# Patient Record
Sex: FEMALE | Race: WHITE | Employment: PART TIME | URBAN - METROPOLITAN AREA
[De-identification: names, ages, dates, MRNs, and addresses within clinical notes are randomized per-mention and may not be internally consistent; named-entity substitution may affect disease eponyms.]

---

## 2017-03-16 ENCOUNTER — HOSPITAL ENCOUNTER (OUTPATIENT)
Dept: CV DIAGNOSTICS | Facility: HOSPITAL | Age: 37
Discharge: HOME | End: 2017-03-16
Attending: FAMILY MEDICINE
Payer: MEDICAID

## 2017-03-16 DIAGNOSIS — R00.2 PALPITATIONS: ICD-10-CM

## 2017-03-16 PROCEDURE — 93225 XTRNL ECG REC<48 HRS REC: CPT

## 2017-03-16 PROCEDURE — 93227 XTRNL ECG REC<48 HR R&I: CPT | Performed by: INTERNAL MEDICINE

## 2017-03-16 PROCEDURE — 93226 XTRNL ECG REC<48 HR SCAN A/R: CPT

## 2017-04-04 ENCOUNTER — HOSPITAL ENCOUNTER (OUTPATIENT)
Age: 37
Discharge: HOME | End: 2017-04-04
Attending: FAMILY MEDICINE
Payer: MEDICAID

## 2017-04-04 VITALS
BODY MASS INDEX: 29.82 KG/M2 | SYSTOLIC BLOOD PRESSURE: 127 MMHG | HEART RATE: 84 BPM | TEMPERATURE: 99 F | OXYGEN SATURATION: 100 % | RESPIRATION RATE: 18 BRPM | HEIGHT: 67 IN | WEIGHT: 190 LBS | DIASTOLIC BLOOD PRESSURE: 81 MMHG

## 2017-04-04 DIAGNOSIS — J01.00 ACUTE NON-RECURRENT MAXILLARY SINUSITIS: Primary | ICD-10-CM

## 2017-04-04 PROCEDURE — 99214 OFFICE O/P EST MOD 30 MIN: CPT

## 2017-04-04 PROCEDURE — 87081 CULTURE SCREEN ONLY: CPT | Performed by: FAMILY MEDICINE

## 2017-04-04 PROCEDURE — 87430 STREP A AG IA: CPT | Performed by: FAMILY MEDICINE

## 2017-04-04 RX ORDER — PREDNISONE 20 MG/1
TABLET ORAL
Qty: 10 TABLET | Refills: 0 | Status: SHIPPED | OUTPATIENT
Start: 2017-04-04 | End: 2018-04-10 | Stop reason: ALTCHOICE

## 2017-04-04 RX ORDER — AMOXICILLIN AND CLAVULANATE POTASSIUM 875; 125 MG/1; MG/1
875 TABLET, FILM COATED ORAL 2 TIMES DAILY
Qty: 20 TABLET | Refills: 0 | Status: SHIPPED | OUTPATIENT
Start: 2017-04-04 | End: 2018-04-10 | Stop reason: ALTCHOICE

## 2017-04-04 RX ORDER — FLUTICASONE PROPIONATE 50 MCG
SPRAY, SUSPENSION (ML) NASAL
Qty: 1 INHALER | Refills: 0 | Status: SHIPPED | OUTPATIENT
Start: 2017-04-04

## 2017-04-04 NOTE — ED PROVIDER NOTES
Patient Seen in: THE [facility]mediate Care In [facility]    History   Patient presents with:  Cough  Ear Pain  Sore Throat    Stated Complaint: Malcolm Mariscal / Bryant Soulier / Sheila Corral / Sapna Faust / NASAL PAIN / DENTAL PAIN    HPI    This 43-year-old female presents (VITAMIN A & D) [id number] UNITS Oral Cap,  Take  by mouth.        Family History   Problem Relation Age of Onset   • Stroke Maternal Grandmother    • Cancer Maternal Grandfather    • Heart Disease Maternal Grandfather          Smoking Status: Never Smoker has acute sinusitis. She is reassured her lungs are clear. I will treat with Augmentin 875 mg twice daily, prednisone 40 mg once daily for 5 days and Flonase. Symptomatic treatment is discussed.   She is to follow-up with her primary doctor in 3-5 days i congested. Push fluids. Humidified air. Follow-up with your primary doctor in 3-5 days if you are not improving. Go to the emergency room if you have increased difficulty breathing.

## 2017-04-04 NOTE — ED INITIAL ASSESSMENT (HPI)
Sinus pressure- x 10 days  Cough- x 1month; chest congestion.  mucinex  bilateral ear- pain started today  Upper right gum - pain

## 2017-05-21 ENCOUNTER — HOSPITAL ENCOUNTER (OUTPATIENT)
Age: 37
Discharge: HOME | End: 2017-05-21
Payer: MEDICAID

## 2017-05-21 ENCOUNTER — APPOINTMENT (OUTPATIENT)
Dept: GENERAL RADIOLOGY | Age: 37
End: 2017-05-21
Attending: PHYSICIAN ASSISTANT
Payer: MEDICAID

## 2017-05-21 VITALS
HEART RATE: 77 BPM | TEMPERATURE: 98 F | DIASTOLIC BLOOD PRESSURE: 85 MMHG | OXYGEN SATURATION: 100 % | SYSTOLIC BLOOD PRESSURE: 121 MMHG | RESPIRATION RATE: 16 BRPM

## 2017-05-21 DIAGNOSIS — J40 BRONCHITIS: Primary | ICD-10-CM

## 2017-05-21 PROCEDURE — 99214 OFFICE O/P EST MOD 30 MIN: CPT

## 2017-05-21 PROCEDURE — 87430 STREP A AG IA: CPT | Performed by: PHYSICIAN ASSISTANT

## 2017-05-21 PROCEDURE — 87081 CULTURE SCREEN ONLY: CPT | Performed by: PHYSICIAN ASSISTANT

## 2017-05-21 PROCEDURE — 71020 XR CHEST PA + LAT CHEST (CPT=71020): CPT | Performed by: PHYSICIAN ASSISTANT

## 2017-05-21 PROCEDURE — 94640 AIRWAY INHALATION TREATMENT: CPT

## 2017-05-21 RX ORDER — ALBUTEROL SULFATE 2.5 MG/3ML
2.5 SOLUTION RESPIRATORY (INHALATION) EVERY 6 HOURS PRN
Qty: 75 ML | Refills: 0 | Status: SHIPPED | OUTPATIENT
Start: 2017-05-21 | End: 2017-10-03

## 2017-05-21 RX ORDER — ALBUTEROL SULFATE 90 UG/1
2 AEROSOL, METERED RESPIRATORY (INHALATION) EVERY 4 HOURS PRN
Qty: 1 INHALER | Refills: 0 | Status: SHIPPED | OUTPATIENT
Start: 2017-05-21 | End: 2017-06-20

## 2017-05-21 RX ORDER — ALBUTEROL SULFATE 2.5 MG/3ML
2.5 SOLUTION RESPIRATORY (INHALATION) ONCE
Status: COMPLETED | OUTPATIENT
Start: 2017-05-21 | End: 2017-05-21

## 2017-05-21 RX ORDER — GUAIFENESIN 200 MG/1
400 TABLET ORAL EVERY 6 HOURS PRN
Qty: 30 TABLET | Refills: 0 | Status: SHIPPED | OUTPATIENT
Start: 2017-05-21 | End: 2018-04-10 | Stop reason: ALTCHOICE

## 2017-05-21 RX ORDER — METHYLPREDNISOLONE 4 MG/1
TABLET ORAL
Qty: 1 PACKAGE | Refills: 0 | Status: SHIPPED | OUTPATIENT
Start: 2017-05-21 | End: 2018-04-10 | Stop reason: ALTCHOICE

## 2017-05-21 NOTE — ED PROVIDER NOTES
Patient Seen in: THE [facility] OF [facility] Immediate Care In [facility] [address]    History   Patient presents with:  Cough/URI    Stated Complaint: Sinus Issues    HPI    CHIEF COMPLAINT: Sore throat, cough and nasal congestion     HISTORY OF PRESENT ILLNESS: Patient is a 40-year sulfate (2.5 MG/3ML) 0.083% Inhalation Nebu Soln,  Take 3 mL (2.5 mg total) by nebulization every 6 (six) hours as needed for Wheezing. Multiple Vitamins-Minerals (MULTI-VITAMIN/MINERALS) Oral Tab,  Take 1 tablet by mouth daily.    B-12 TR 1000 MCG Oral T Current:/85 mmHg  Pulse 77  Temp(Src) 97.9 °F (36.6 °C) (Temporal)  Resp 16  SpO2 100%  LMP 05/14/2017        Physical Exam    Vital signs and nursing notes reviewed  General Appearance: Patient is alert and oriented x4 in no acute distress  Ey effusions. BONES:  Normal for age.      5/21/2017  CONCLUSION:  Unremarkable chest radiographs.      Dictated by: Cassy De Jesus MD on 5/21/2017 at 12:01     Approved by: Cassy De Jesus MD            [facility]     70-year-old female diagnosed with bronchi

## 2017-05-21 NOTE — ED INITIAL ASSESSMENT (HPI)
Pt began 3 days ago with sore throat nasal congestion and cough.   Hurts to take a deep breath, and has some facial pain

## 2017-05-31 ENCOUNTER — HOSPITAL ENCOUNTER (OUTPATIENT)
Age: 37
Discharge: HOME | End: 2017-05-31
Payer: MEDICAID

## 2017-05-31 VITALS
TEMPERATURE: 99 F | SYSTOLIC BLOOD PRESSURE: 130 MMHG | HEART RATE: 97 BPM | HEIGHT: 67 IN | RESPIRATION RATE: 16 BRPM | DIASTOLIC BLOOD PRESSURE: 88 MMHG | WEIGHT: 195 LBS | OXYGEN SATURATION: 99 % | BODY MASS INDEX: 30.61 KG/M2

## 2017-05-31 DIAGNOSIS — J01.00 ACUTE NON-RECURRENT MAXILLARY SINUSITIS: Primary | ICD-10-CM

## 2017-05-31 PROCEDURE — 99214 OFFICE O/P EST MOD 30 MIN: CPT

## 2017-05-31 PROCEDURE — 99213 OFFICE O/P EST LOW 20 MIN: CPT

## 2017-05-31 RX ORDER — AMOXICILLIN AND CLAVULANATE POTASSIUM 875; 125 MG/1; MG/1
1 TABLET, FILM COATED ORAL 2 TIMES DAILY
Qty: 14 TABLET | Refills: 0 | Status: SHIPPED | OUTPATIENT
Start: 2017-05-31 | End: 2017-06-07

## 2017-06-01 NOTE — ED PROVIDER NOTES
Patient Seen in: THE MEDICAL [facility] Immediate Care In [facility]    History   Patient presents with:  Fever    Stated Complaint: fever,ha-poss reaction to meds    HPI    CHIEF COMPLAINT: Sinus pressure and pain, fever     HISTORY OF PRESENT ILLNESS: Patient is a 39 OTHER SURGICAL HISTORY      Comment Cyst removed from throat    OTHER      Comment syst removed from neck    THROAT SURGERY PROCEDURE UNLISTED  Dec 2014    Comment cyst removed from throat,        Medications :   Amoxicillin-Pot Clavulanate 875-125 MG O Smoking Status: Never Smoker                      Alcohol Use: Yes                Comment: occasional      Review of Systems    Positive for stated complaint: fever,ha-poss reaction to meds  Other systems are as noted in HPI.   Constitutional and vital sig are symmetrical, full range of motion  Psychiatric: calm and cooperative    ED Course   Labs Reviewed - No data to display    MDM     30-year-old female with acute sinusitis. DC Bactrim. Will put her on Augmentin.   She should follow-up with her primary c

## 2017-06-01 NOTE — ED NOTES
Pt called due to bodyaches,headache,fever of 100.4 today, no fever reducer taken ,she took benadryl because she thought she was having an allergic reaction. Pt advised to go to the ER as instructed in AVS for any new or  worsening symptoms,fever.  Pt verbal

## 2017-06-01 NOTE — ED INITIAL ASSESSMENT (HPI)
Headache- with bodyaches,chills, fever 100.4  after taking the bactrim  For UTI at 1245 pm today. ;pt also took medrol dose pack  and omeprazole 12:45 pm. . Pt concerned with allergic reaction.   Pt was seen here on the may 21 dx with bronchitis,  And today

## 2017-06-02 ENCOUNTER — APPOINTMENT (OUTPATIENT)
Dept: CT IMAGING | Facility: HOSPITAL | Age: 37
End: 2017-06-02
Attending: EMERGENCY MEDICINE
Payer: MEDICAID

## 2017-06-02 ENCOUNTER — HOSPITAL ENCOUNTER (EMERGENCY)
Facility: HOSPITAL | Age: 37
Discharge: HOME | End: 2017-06-02
Attending: EMERGENCY MEDICINE
Payer: MEDICAID

## 2017-06-02 VITALS
WEIGHT: 195 LBS | TEMPERATURE: 99 F | SYSTOLIC BLOOD PRESSURE: 128 MMHG | HEIGHT: 67 IN | DIASTOLIC BLOOD PRESSURE: 75 MMHG | HEART RATE: 90 BPM | BODY MASS INDEX: 30.61 KG/M2 | OXYGEN SATURATION: 98 % | RESPIRATION RATE: 16 BRPM

## 2017-06-02 DIAGNOSIS — J32.2 CHRONIC ETHMOIDAL SINUSITIS: Primary | ICD-10-CM

## 2017-06-02 PROCEDURE — 99285 EMERGENCY DEPT VISIT HI MDM: CPT

## 2017-06-02 PROCEDURE — 85025 COMPLETE CBC W/AUTO DIFF WBC: CPT | Performed by: EMERGENCY MEDICINE

## 2017-06-02 PROCEDURE — 81001 URINALYSIS AUTO W/SCOPE: CPT | Performed by: EMERGENCY MEDICINE

## 2017-06-02 PROCEDURE — 81025 URINE PREGNANCY TEST: CPT

## 2017-06-02 PROCEDURE — 87086 URINE CULTURE/COLONY COUNT: CPT | Performed by: EMERGENCY MEDICINE

## 2017-06-02 PROCEDURE — 80053 COMPREHEN METABOLIC PANEL: CPT | Performed by: EMERGENCY MEDICINE

## 2017-06-02 PROCEDURE — 70486 CT MAXILLOFACIAL W/O DYE: CPT | Performed by: EMERGENCY MEDICINE

## 2017-06-02 PROCEDURE — 99284 EMERGENCY DEPT VISIT MOD MDM: CPT

## 2017-06-02 PROCEDURE — 96360 HYDRATION IV INFUSION INIT: CPT

## 2017-06-02 RX ORDER — ACETAMINOPHEN 500 MG
1000 TABLET ORAL ONCE
Status: COMPLETED | OUTPATIENT
Start: 2017-06-02 | End: 2017-06-02

## 2017-06-02 RX ORDER — SODIUM CHLORIDE 9 MG/ML
1000 INJECTION, SOLUTION INTRAVENOUS ONCE
Status: COMPLETED | OUTPATIENT
Start: 2017-06-02 | End: 2017-06-02

## 2017-06-02 NOTE — ED NOTES
Pt states she is concerned about multiple symptoms that are not severe, but present such as stiff joints, numbness on/off R hand; joint pain in feet, spotty rash to lower extremities.   She does not know if this is from antibiotics she is taking

## 2017-06-02 NOTE — ED PROVIDER NOTES
Patient Seen in: [facility] Emergency Department    History   Patient presents with:  Headache (neurologic)    Stated Complaint: headache    HPI    27-year-old female presents emergency department who apparently was started on some Bactrim Wednesday 50 MCG/ACT Nasal Suspension,  Use 2 sprays each nostril once daily after shower. Stop if you develop nose bleeds. predniSONE 20 MG Oral Tab,  Take 2 tablets once daily with lunch for 5 days.    Multiple Vitamins-Minerals (MULTI-VITAMIN/MINERALS) Oral Tab, light extraocular muscles intact no scleral icterus, mucous membranes are moist, there is no erythema or exudate in the posterior pharynx, patient has sinus tenderness to percussion  Neck: Supple no JVD no lymphadenopathy no meningismus no carotid bruit  C her a lot of these complaints may not be will be fixed today but we will make sure she does not have a bladder infection. Will check her sinuses. And decide what antibiotic may be best for her.     Xr Chest Pa + Lat Chest (cpt=71020)    5/21/2017  PROCEDU large right irina bullosa within the middle turbinate. The left osteomeatal unit appears patent. SINUSES:  There is mild partial opacification within the posterior right-sided ethmoid air cells. No air-fluid levels are identified.  The bilateral maxillary,

## 2017-06-02 NOTE — ED INITIAL ASSESSMENT (HPI)
Pt started Bactrim on Wednesday for UTI. She began Augmentin for a sinus infection on Thursday. Pt is complaining of frontal headache and pain around her eyes. Pt complains that her eyes are bloodshot and complaining of stiffness of her hands.  Patient has

## 2017-08-16 ENCOUNTER — HOSPITAL ENCOUNTER (OUTPATIENT)
Age: 37
Discharge: HOME | End: 2017-08-16
Attending: FAMILY MEDICINE
Payer: MEDICAID

## 2017-08-16 VITALS
OXYGEN SATURATION: 98 % | RESPIRATION RATE: 16 BRPM | HEART RATE: 83 BPM | DIASTOLIC BLOOD PRESSURE: 87 MMHG | TEMPERATURE: 98 F | SYSTOLIC BLOOD PRESSURE: 121 MMHG

## 2017-08-16 DIAGNOSIS — N39.0 UTI (URINARY TRACT INFECTION), UNCOMPLICATED: Primary | ICD-10-CM

## 2017-08-16 DIAGNOSIS — J32.0 CHRONIC MAXILLARY SINUSITIS: ICD-10-CM

## 2017-08-16 DIAGNOSIS — J30.2 ACUTE SEASONAL ALLERGIC RHINITIS, UNSPECIFIED TRIGGER: ICD-10-CM

## 2017-08-16 LAB
POCT BILIRUBIN URINE: NEGATIVE
POCT BLOOD URINE: NEGATIVE
POCT GLUCOSE URINE: NEGATIVE MG/DL
POCT KETONE URINE: NEGATIVE MG/DL
POCT NITRITE URINE: NEGATIVE
POCT PH URINE: 5.5 (ref 5–8)
POCT PROTEIN URINE: NEGATIVE MG/DL
POCT SPECIFIC GRAVITY URINE: 1
POCT URINE PREGNANCY: NEGATIVE
POCT UROBILINOGEN URINE: 0.2 MG/DL

## 2017-08-16 PROCEDURE — 81002 URINALYSIS NONAUTO W/O SCOPE: CPT | Performed by: FAMILY MEDICINE

## 2017-08-16 PROCEDURE — 99214 OFFICE O/P EST MOD 30 MIN: CPT

## 2017-08-16 PROCEDURE — 81025 URINE PREGNANCY TEST: CPT | Performed by: FAMILY MEDICINE

## 2017-08-16 PROCEDURE — 87086 URINE CULTURE/COLONY COUNT: CPT | Performed by: FAMILY MEDICINE

## 2017-08-16 RX ORDER — CEFUROXIME AXETIL 250 MG/1
250 TABLET ORAL 2 TIMES DAILY
Qty: 14 TABLET | Refills: 0 | Status: SHIPPED | OUTPATIENT
Start: 2017-08-16 | End: 2018-04-10 | Stop reason: ALTCHOICE

## 2017-08-16 NOTE — ED PROVIDER NOTES
Patient Seen in: THE MEDICAL [facility] Immediate Care In [facility]    History   Patient presents with:  Cough/URI  Urinary Symptoms (urologic)    Stated Complaint: 2-3 DAYS SINUS ISSUES    HPI    This 40-year-old female presents to the office with complaint of sinus needed for Wheezing. Amoxicillin-Pot Clavulanate (AUGMENTIN) 875-125 MG Oral Tab,  Take 1 tablet by mouth 2 (two) times daily. TAKE WITH FOOD. STOP FOR GI UPSET. predniSONE 20 MG Oral Tab,  Take 2 tablets once daily with lunch for 5 days.    Multiple Vi conjunctiva normal.  EARS: Tympanic membranes normal, EAC's normal.  NOSE: Turbinates congested, no bleeding noted. PHARYNX:  No eythema or exudates, tonsils normal size, airway patent, uvula midline  NECK:  No cervical lymphadenopathy.  No thyromegaly,  H symptoms worsen    Serg Lainez MD  [address] [facility],Fourth [address] [facility] [address]  [phone]    Schedule an appointment as soon as possible for a visit in 1 week  regarding your sinuses      Medications Prescribed:  Current Discharge Medication Alray Stable

## 2017-08-16 NOTE — ED INITIAL ASSESSMENT (HPI)
Patient presents with complaints of sinus congestion with facial pian under the right eye orbit and nasal congestion with episodes  of feeling dizzy x 4 days. Patient also complains of suprapubic pressure, urinary urgency x 2 days.  Patient does and=ben to

## 2017-10-03 ENCOUNTER — HOSPITAL ENCOUNTER (OUTPATIENT)
Age: 37
Discharge: HOME | End: 2017-10-03
Attending: FAMILY MEDICINE
Payer: MEDICAID

## 2017-10-03 VITALS
TEMPERATURE: 99 F | DIASTOLIC BLOOD PRESSURE: 89 MMHG | HEART RATE: 85 BPM | OXYGEN SATURATION: 98 % | SYSTOLIC BLOOD PRESSURE: 134 MMHG | RESPIRATION RATE: 20 BRPM

## 2017-10-03 DIAGNOSIS — J45.21 MILD INTERMITTENT ASTHMA WITH EXACERBATION: Primary | ICD-10-CM

## 2017-10-03 PROCEDURE — 99214 OFFICE O/P EST MOD 30 MIN: CPT

## 2017-10-03 PROCEDURE — 94640 AIRWAY INHALATION TREATMENT: CPT

## 2017-10-03 RX ORDER — PREDNISONE 20 MG/1
TABLET ORAL
Qty: 6 TABLET | Refills: 0 | Status: SHIPPED | OUTPATIENT
Start: 2017-10-03 | End: 2018-04-10 | Stop reason: ALTCHOICE

## 2017-10-03 RX ORDER — ALBUTEROL SULFATE 2.5 MG/3ML
2.5 SOLUTION RESPIRATORY (INHALATION) EVERY 4 HOURS PRN
Qty: 1 BOX | Refills: 0 | Status: SHIPPED | OUTPATIENT
Start: 2017-10-03

## 2017-10-03 RX ORDER — ALBUTEROL SULFATE 90 UG/1
2 AEROSOL, METERED RESPIRATORY (INHALATION) EVERY 4 HOURS PRN
Qty: 1 INHALER | Refills: 0 | Status: SHIPPED | OUTPATIENT
Start: 2017-10-03

## 2017-10-03 RX ORDER — IPRATROPIUM BROMIDE AND ALBUTEROL SULFATE 2.5; .5 MG/3ML; MG/3ML
3 SOLUTION RESPIRATORY (INHALATION) ONCE
Status: COMPLETED | OUTPATIENT
Start: 2017-10-03 | End: 2017-10-03

## 2017-10-04 NOTE — ED PROVIDER NOTES
Patient Seen in: [facility] In [facility]    History   Patient presents with:  Cough/URI    Stated Complaint: congestion     HPI    This 20-year-old female presents to the office with a two-week history of nasal congestion, cough and wheezing Exam    General: WH/WN/WD, in no respiratory distress, A and O times 3  HEAD: Normocephalic, atraumatic  EYES: Sclera anicteric,  conjunctiva normal.  EARS: Tympanic membranes normal, EAC's normal.  NOSE: Turbinates mildly congested, no bleeding noted.   [address] [address]  [phone]    Schedule an appointment as soon as possible for a visit in 2 days  If symptoms worsen      Medications Prescribed:  Current Discharge Medication List    START taking these medications    ! ! predniSONE 20 MG Oral Tab  Take 2 tablets w

## 2018-04-10 ENCOUNTER — HOSPITAL ENCOUNTER (OUTPATIENT)
Age: 38
Discharge: HOME | End: 2018-04-10
Payer: MEDICAID

## 2018-04-10 ENCOUNTER — APPOINTMENT (OUTPATIENT)
Dept: CT IMAGING | Age: 38
End: 2018-04-10
Attending: PHYSICIAN ASSISTANT
Payer: MEDICAID

## 2018-04-10 VITALS
TEMPERATURE: 99 F | SYSTOLIC BLOOD PRESSURE: 122 MMHG | HEART RATE: 89 BPM | DIASTOLIC BLOOD PRESSURE: 92 MMHG | RESPIRATION RATE: 18 BRPM | OXYGEN SATURATION: 98 %

## 2018-04-10 DIAGNOSIS — Z87.440 HISTORY OF FREQUENT URINARY TRACT INFECTIONS: ICD-10-CM

## 2018-04-10 DIAGNOSIS — R10.9 FLANK PAIN: ICD-10-CM

## 2018-04-10 DIAGNOSIS — J01.41 ACUTE RECURRENT PANSINUSITIS: Primary | ICD-10-CM

## 2018-04-10 PROCEDURE — 36415 COLL VENOUS BLD VENIPUNCTURE: CPT

## 2018-04-10 PROCEDURE — 99214 OFFICE O/P EST MOD 30 MIN: CPT

## 2018-04-10 PROCEDURE — 74176 CT ABD & PELVIS W/O CONTRAST: CPT | Performed by: PHYSICIAN ASSISTANT

## 2018-04-10 PROCEDURE — 87086 URINE CULTURE/COLONY COUNT: CPT | Performed by: PHYSICIAN ASSISTANT

## 2018-04-10 PROCEDURE — 81025 URINE PREGNANCY TEST: CPT | Performed by: PHYSICIAN ASSISTANT

## 2018-04-10 PROCEDURE — 85025 COMPLETE CBC W/AUTO DIFF WBC: CPT | Performed by: PHYSICIAN ASSISTANT

## 2018-04-10 PROCEDURE — 80047 BASIC METABLC PNL IONIZED CA: CPT

## 2018-04-10 PROCEDURE — 81002 URINALYSIS NONAUTO W/O SCOPE: CPT | Performed by: PHYSICIAN ASSISTANT

## 2018-04-10 RX ORDER — PREDNISONE 20 MG/1
40 TABLET ORAL DAILY
Qty: 8 TABLET | Refills: 0 | Status: SHIPPED | OUTPATIENT
Start: 2018-04-10 | End: 2018-04-14

## 2018-04-10 RX ORDER — CHOLECALCIFEROL (VITAMIN D3) 1250 MCG
CAPSULE ORAL
COMMUNITY

## 2018-04-10 RX ORDER — AMOXICILLIN AND CLAVULANATE POTASSIUM 875; 125 MG/1; MG/1
1 TABLET, FILM COATED ORAL 2 TIMES DAILY
Qty: 14 TABLET | Refills: 0 | Status: SHIPPED | OUTPATIENT
Start: 2018-04-10 | End: 2018-04-17

## 2018-04-10 NOTE — ED PROVIDER NOTES
Patient Seen in: [address] [facility] In [facility]    History   Patient presents with:  Sinus Problem    Stated Complaint: 7-10 days uti & sinus issues    HPI    43-year-old female with multiple complaints:  PT reports that she suffers from frequent s [04/10/18 0922]  BP: 122/92  Pulse: 88  Resp: 20  Temp: 98.5 °F (36.9 °C)  Temp src: Temporal  SpO2: 99 %  O2 Device: None (Room air)    Current:/92   Pulse 89   Temp 98.5 °F (36.9 °C) (Temporal)   Resp 18   LMP 03/30/2018   SpO2 98%         Physical following:     ISTAT Glucose 120 (*)     All other components within normal limits   POCT PREGNANCY, URINE - Normal   POCT CBC   URINE CULTURE, ROUTINE   Ct Abdomen+pelvis Kidneystone 2d Rndr(no Iv,no Oral)(cpt=74176)    Result Date: 4/10/2018  PROCEDURE: ED Course as of Apr 10 1117  ------------------------------------------------------------       [facility]     Clinical Impression: Sinusitis/flank pain/frequent urinary tract infections.   Course of Treatment: Please take the Augmentin in tandem with the c

## 2018-04-10 NOTE — ED INITIAL ASSESSMENT (HPI)
Pt states she has had sinus trouble since 2014. States has had numerous sinus infection and UTIs  States always has urinary frequency. Describes pain below left breast and under her left arm and thoracic back. Lower back pain bilaterally on palpation.

## 2023-10-17 ENCOUNTER — APPOINTMENT (OUTPATIENT)
Dept: URBAN - METROPOLITAN AREA CLINIC 241 | Age: 43
Setting detail: DERMATOLOGY
End: 2023-10-17

## 2023-10-17 DIAGNOSIS — L82.1 OTHER SEBORRHEIC KERATOSIS: ICD-10-CM

## 2023-10-17 DIAGNOSIS — Z71.89 OTHER SPECIFIED COUNSELING: ICD-10-CM

## 2023-10-17 DIAGNOSIS — L81.4 OTHER MELANIN HYPERPIGMENTATION: ICD-10-CM

## 2023-10-17 DIAGNOSIS — D49.2 NEOPLASM OF UNSPECIFIED BEHAVIOR OF BONE, SOFT TISSUE, AND SKIN: ICD-10-CM

## 2023-10-17 DIAGNOSIS — L57.8 OTHER SKIN CHANGES DUE TO CHRONIC EXPOSURE TO NONIONIZING RADIATION: ICD-10-CM

## 2023-10-17 DIAGNOSIS — L91.8 OTHER HYPERTROPHIC DISORDERS OF THE SKIN: ICD-10-CM

## 2023-10-17 DIAGNOSIS — D22 MELANOCYTIC NEVI: ICD-10-CM

## 2023-10-17 DIAGNOSIS — D18.0 HEMANGIOMA: ICD-10-CM

## 2023-10-17 DIAGNOSIS — L20.89 OTHER ATOPIC DERMATITIS: ICD-10-CM

## 2023-10-17 PROBLEM — D22.61 MELANOCYTIC NEVI OF RIGHT UPPER LIMB, INCLUDING SHOULDER: Status: ACTIVE | Noted: 2023-10-17

## 2023-10-17 PROBLEM — D18.01 HEMANGIOMA OF SKIN AND SUBCUTANEOUS TISSUE: Status: ACTIVE | Noted: 2023-10-17

## 2023-10-17 PROCEDURE — OTHER PRESCRIPTION MEDICATION MANAGEMENT: OTHER

## 2023-10-17 PROCEDURE — OTHER COUNSELING: OTHER

## 2023-10-17 PROCEDURE — OTHER BENIGN DESTRUCTION COSMETIC: OTHER

## 2023-10-17 PROCEDURE — OTHER MIPS QUALITY: OTHER

## 2023-10-17 PROCEDURE — OTHER PRESCRIPTION: OTHER

## 2023-10-17 PROCEDURE — OTHER MEDICATION COUNSELING: OTHER

## 2023-10-17 PROCEDURE — 11306 SHAVE SKIN LESION 0.6-1.0 CM: CPT

## 2023-10-17 PROCEDURE — 99213 OFFICE O/P EST LOW 20 MIN: CPT | Mod: 25

## 2023-10-17 PROCEDURE — OTHER SHAVE REMOVAL: OTHER

## 2023-10-17 RX ORDER — TRIAMCINOLONE ACETONIDE 1 MG/G
CREAM TOPICAL
Qty: 80 | Refills: 3 | Status: ERX | COMMUNITY
Start: 2023-10-17

## 2023-10-17 ASSESSMENT — LOCATION ZONE DERM
LOCATION ZONE: NECK
LOCATION ZONE: ARM
LOCATION ZONE: AXILLAE
LOCATION ZONE: LEG
LOCATION ZONE: VULVA
LOCATION ZONE: AXILLAE
LOCATION ZONE: TRUNK

## 2023-10-17 ASSESSMENT — LOCATION DETAILED DESCRIPTION DERM
LOCATION DETAILED: LEFT PROXIMAL PRETIBIAL REGION
LOCATION DETAILED: RIGHT AXILLARY VAULT
LOCATION DETAILED: RIGHT AXILLARY VAULT
LOCATION DETAILED: LEFT INFERIOR ANTERIOR NECK
LOCATION DETAILED: LEFT MEDIAL SUPERIOR CHEST
LOCATION DETAILED: MONS PUBIS
LOCATION DETAILED: RIGHT ANTERIOR PROXIMAL UPPER ARM

## 2023-10-17 ASSESSMENT — LOCATION SIMPLE DESCRIPTION DERM
LOCATION SIMPLE: RIGHT AXILLARY VAULT
LOCATION SIMPLE: LEFT ANTERIOR NECK
LOCATION SIMPLE: LEFT PRETIBIAL REGION
LOCATION SIMPLE: RIGHT AXILLARY VAULT
LOCATION SIMPLE: GROIN
LOCATION SIMPLE: RIGHT UPPER ARM
LOCATION SIMPLE: CHEST

## 2023-10-17 NOTE — PROCEDURE: PRESCRIPTION MEDICATION MANAGEMENT
Continue Regimen: Triamcinalone 0.1% topical cream
Render In Strict Bullet Format?: No
Detail Level: Zone

## 2023-10-17 NOTE — PROCEDURE: BENIGN DESTRUCTION COSMETIC
Anesthesia Volume In Cc: 0.5
Post-Care Instructions: I reviewed with the patient in detail post-care instructions. Patient is to wear sunprotection, and avoid picking at any of the treated lesions. Pt may apply Vaseline to crusted or scabbing areas.
Price (Use Numbers Only, No Special Characters Or $): 15
Detail Level: Detailed
Consent: The patient's consent was obtained including but not limited to risks of crusting, scabbing, blistering, scarring, darker or lighter pigmentary change, recurrence, incomplete removal and infection.

## 2023-10-17 NOTE — PROCEDURE: MIPS QUALITY
Detail Level: Detailed
Quality 110: Preventive Care And Screening: Influenza Immunization: Influenza Immunization previously received during influenza season
Quality 431: Preventive Care And Screening: Unhealthy Alcohol Use - Screening: Patient not identified as an unhealthy alcohol user when screened for unhealthy alcohol use using a systematic screening method
Quality 130: Documentation Of Current Medications In The Medical Record: Current Medications Documented
Quality 226: Preventive Care And Screening: Tobacco Use: Screening And Cessation Intervention: Patient screened for tobacco use and is an ex/non-smoker
Quality 47: Advance Care Plan: Advance Care Planning discussed and documented in the medical record; patient did not wish or was not able to name a surrogate decision maker or provide an advance care plan.

## 2023-10-17 NOTE — PROCEDURE: SHAVE REMOVAL
Medical Necessity Information: It is in your best interest to select a reason for this procedure from the list below. All of these items fulfill various CMS LCD requirements except the new and changing color options.
Medical Necessity Clause: This procedure was medically necessary because the lesion that was treated was:
Lab: -[id number]
Body Location Override (Optional - Billing Will Still Be Based On Selected Body Map Location If Applicable): right groin
Detail Level: Detailed
Was A Bandage Applied: Yes
Size Of Lesion In Cm (Required): 0.8
X Size Of Lesion In Cm (Optional): 0
Depth Of Shave: dermis
Biopsy Method: Personna blade
Anesthesia Type: 1% lidocaine with epinephrine
Hemostasis: Drysol
Wound Care: Petrolatum
Render Path Notes In Note?: No
Consent was obtained from the patient. The risks and benefits to therapy were discussed in detail. Specifically, the risks of infection, scarring, bleeding, prolonged wound healing, incomplete removal, allergy to anesthesia, nerve injury and recurrence were addressed. Prior to the procedure, the treatment site was clearly identified and confirmed by the patient. All components of Universal Protocol/PAUSE Rule completed.
Post-Care Instructions: I reviewed with the patient in detail post-care instructions. Patient is to keep the biopsy site dry overnight, and then apply bacitracin twice daily until healed. Patient may apply hydrogen peroxide soaks to remove any crusting.
Notification Instructions: Patient will be notified of pathology results. However, patient instructed to call the office if not contacted within 2 weeks.
Billing Type: Third-Party Bill

## 2024-02-13 NOTE — PROCEDURE: MEDICATION COUNSELING
13-Feb-2024 04:05 Tremfya Counseling: I discussed with the patient the risks of guselkumab including but not limited to immunosuppression, serious infections, and drug reactions.  The patient understands that monitoring is required including a PPD at baseline and must alert us or the primary physician if symptoms of infection or other concerning signs are noted.

## (undated) NOTE — LETTER
Date & Time: 4/10/2018, 11:11 AM  Patient: Sana Kumar  Attending Provider:    Sincerely,    WANDA Damon         To Whom It May Concern:    Mishel Bender was seen and treated in our department on 4/10/2018.  She may return to school tomorrow if

## (undated) NOTE — ED AVS SNAPSHOT
[facility] in [address] [place] [address] Drive,4Th Floor    [address] [place] [address]    Phone:  [phone]    Fax:  [phone]           Danish Alberts   MRN: [id number]    Department:  [facility] in [facility] [place]   Date of Visit:  5/31/2017 [address]  [phone] [address] [place] [address], [facility]   [phone]       To Check ER Wait Times:  TEXT 'Tito Shahid' to 69[address]      Click [email or web address]      Or call [phone]    If you have phone number before you leave. After you leave, you should follow the attached instructions. I have read and understand the instructions given to me by my caregivers.         24-Hour Pharmacies        Pharmacy Address Phone Number   [phone] view more details from this visit by going to [email or web address]. If you've recently had a stay at the Hospital you can access your discharge instructions in [employer] by going to Visits < Admission Summaries.  If you've been to the Emergency Depar

## (undated) NOTE — ED AVS SNAPSHOT
[facility] in [address] [place] [address] Drive,4Th Floor    [address] [place] [address]    Phone:  [phone]    Fax:  [phone]           Young Morocho   MRN: [id number]    Department:  THE [facility] OF [facility] Immediate Care in [facility] END   Date of Visit:  4/4/2017 Where to Get Your Medications      These medications were sent to [address], [address], [phone], [address], [place] [facility] [address]    Hours:  24-hours Phone:  [phone] nuestro adminstrador de elvin al [phone]. Expect to receive an electronic request (by e-mail or text) to complete a self-assessment the day after your visit. You may also receive a call from our patient liason soon after your visit.  Also, some [place] [address] (Sherlyn Augustine) [address]  ([address]) [address] [facility]. ([address]) [address] [facility] [address] (8 [employer] questions? Call [phone] for help. [employer] is NOT to be used for urgent needs. For medical emergencies, dial 911.

## (undated) NOTE — ED AVS SNAPSHOT
[facility] Emergency Department    [place]  [address] [place] [address]    Phone:  [phone]    Fax:  [phone]           Itzel Amado   MRN: [id number]    Department:  [facility] Emergency Department   Date of Visit:  6/2/2 IF THERE IS ANY CHANGE OR WORSENING OF YOUR CONDITION, CALL YOUR PRIMARY CARE PHYSICIAN AT ONCE OR RETURN IMMEDIATELY TO THE EMERGENCY DEPARTMENT.     If you have been prescribed any medication(s), please fill your prescription right away and begin taking t

## (undated) NOTE — ED AVS SNAPSHOT
[facility] in [address] [place] [address] Drive,4Th Floor    [address] [place] [address]    Phone:  [phone]    Fax:  [phone]           HOLLIEmain Corrie   MRN: [id number]    Department:  [facility] Immediate Care in [facility] END   Date of Visit:  5/21/2017 If you have new, changing or worsening symptoms, such as high fever, shortness of breath or chest pain, please go directly to the ER.     Discharge References/Attachments     BRONCHITIS, NO ANTIBIOTIC (ADULT) (ENGLISH)      Disclosure     Insurance plans [place] Immediate Cares. Follow-up care is at the discretion of that Physician. IF THERE IS ANY CHANGE OR WORSENING OF YOUR CONDITION, CALL YOUR PRIMARY CARE PHYSICIAN AT ONCE OR GO TO THE EMERGENCY DEPARTMENT.     If you have been prescribed any medication(s), - If you don’t have insurance, Herber Banegas has partnered with Patient [facility] [employer] to help you get signed up for insurance coverage.   Patient Cara [facility] [place] is a Federal Navigator program that can help with your Affordable Care Act cover Call [phone] for help. [employer] is NOT to be used for urgent needs. For medical emergencies, dial 911.

## (undated) NOTE — ED AVS SNAPSHOT
[facility] Emergency Department    [place]  [address] [place] [address]    Phone:  [phone]    Fax:  [phone]           Shanita Rajput   MRN: [id number]    Department:  [facility] Emergency Department   Date of Visit:  6/2/2 at [phone]    Si usted tiene algun problema con barnes sequimiento, por favor llame a nuestro adminstrador de casos al [phone]    Expect to receive an electronic request (by e-mail or text) to complete a self-assessment the day after your visit. Rico Julien [facility] [address] [facility],4Th Floor (Maldonado Bal) [address] ([address]) [address] [place]  ([address]) [address] [facility] 3. There is mild right nasal septal deviation.          Dictated by: Roselee Canavan, MD on 6/02/2017 at 19:18       Approved by: Roselee Canavan, MD              Narrative:    PROCEDURE:  CT OF THE SINUS (SCANNED AXIALLY AND CORONALLY) WITHOUT CONTRAST Summaries. If you've been to the Emergency Department or your doctor's office, you can view your past visit information in [employer] by going to Visits < Visit Summaries. [employer] questions? Call [phone] for help.   [employer] is NOT to be used for urge